# Patient Record
Sex: FEMALE | Race: WHITE | Employment: FULL TIME | URBAN - METROPOLITAN AREA
[De-identification: names, ages, dates, MRNs, and addresses within clinical notes are randomized per-mention and may not be internally consistent; named-entity substitution may affect disease eponyms.]

---

## 2016-07-25 LAB — NEGATIVE: NORMAL

## 2019-03-07 ENCOUNTER — OFFICE VISIT (OUTPATIENT)
Dept: FAMILY MEDICINE | Facility: CLINIC | Age: 69
End: 2019-03-07
Payer: COMMERCIAL

## 2019-03-07 VITALS
HEIGHT: 62 IN | BODY MASS INDEX: 41.04 KG/M2 | WEIGHT: 223 LBS | SYSTOLIC BLOOD PRESSURE: 172 MMHG | OXYGEN SATURATION: 94 % | TEMPERATURE: 97.7 F | HEART RATE: 69 BPM | DIASTOLIC BLOOD PRESSURE: 99 MMHG

## 2019-03-07 DIAGNOSIS — I10 ESSENTIAL HYPERTENSION: ICD-10-CM

## 2019-03-07 DIAGNOSIS — Z78.9 VEGETARIAN DIET: ICD-10-CM

## 2019-03-07 DIAGNOSIS — Z78.0 MENOPAUSE: ICD-10-CM

## 2019-03-07 DIAGNOSIS — G60.9 IDIOPATHIC PERIPHERAL NEUROPATHY: Primary | ICD-10-CM

## 2019-03-07 DIAGNOSIS — Z12.31 VISIT FOR SCREENING MAMMOGRAM: ICD-10-CM

## 2019-03-07 DIAGNOSIS — M15.0 PRIMARY OSTEOARTHRITIS INVOLVING MULTIPLE JOINTS: ICD-10-CM

## 2019-03-07 DIAGNOSIS — G47.00 INSOMNIA, UNSPECIFIED TYPE: ICD-10-CM

## 2019-03-07 LAB
ANION GAP SERPL CALCULATED.3IONS-SCNC: 9 MMOL/L (ref 3–14)
BUN SERPL-MCNC: 14 MG/DL (ref 7–30)
CALCIUM SERPL-MCNC: 9.1 MG/DL (ref 8.5–10.1)
CHLORIDE SERPL-SCNC: 104 MMOL/L (ref 94–109)
CHOLEST SERPL-MCNC: 221 MG/DL
CO2 SERPL-SCNC: 27 MMOL/L (ref 20–32)
CREAT SERPL-MCNC: 0.88 MG/DL (ref 0.52–1.04)
ERYTHROCYTE [DISTWIDTH] IN BLOOD BY AUTOMATED COUNT: 13.8 % (ref 10–15)
GFR SERPL CREATININE-BSD FRML MDRD: 68 ML/MIN/{1.73_M2}
GLUCOSE SERPL-MCNC: 107 MG/DL (ref 70–99)
HCT VFR BLD AUTO: 43.3 % (ref 35–47)
HDLC SERPL-MCNC: 57 MG/DL
HGB BLD-MCNC: 13.6 G/DL (ref 11.7–15.7)
LDLC SERPL CALC-MCNC: 133 MG/DL
MCH RBC QN AUTO: 31.7 PG (ref 26.5–33)
MCHC RBC AUTO-ENTMCNC: 31.4 G/DL (ref 31.5–36.5)
MCV RBC AUTO: 101 FL (ref 78–100)
NONHDLC SERPL-MCNC: 164 MG/DL
PLATELET # BLD AUTO: 242 10E9/L (ref 150–450)
POTASSIUM SERPL-SCNC: 4.2 MMOL/L (ref 3.4–5.3)
RBC # BLD AUTO: 4.29 10E12/L (ref 3.8–5.2)
SODIUM SERPL-SCNC: 141 MMOL/L (ref 133–144)
TRIGL SERPL-MCNC: 152 MG/DL
WBC # BLD AUTO: 5 10E9/L (ref 4–11)

## 2019-03-07 RX ORDER — DIPHENHYD/PHENYLEPH/ACETAMINOP 12.5-5-325
TABLET ORAL
COMMUNITY
Start: 2018-03-26

## 2019-03-07 RX ORDER — TRAZODONE HYDROCHLORIDE 50 MG/1
TABLET, FILM COATED ORAL
Qty: 60 TABLET | Refills: 3 | Status: SHIPPED | OUTPATIENT
Start: 2019-03-07

## 2019-03-07 RX ORDER — LOTEPREDNOL ETABONATE 5 MG/G
1-2 GEL OPHTHALMIC
COMMUNITY
Start: 2016-10-31

## 2019-03-07 RX ORDER — HYDROXYZINE HYDROCHLORIDE 10 MG/1
10 TABLET, FILM COATED ORAL
COMMUNITY
End: 2019-03-07

## 2019-03-07 RX ORDER — TRAZODONE HYDROCHLORIDE 50 MG/1
100 TABLET, FILM COATED ORAL
COMMUNITY
Start: 2016-12-07 | End: 2019-03-07

## 2019-03-07 RX ORDER — DULOXETIN HYDROCHLORIDE 60 MG/1
60 CAPSULE, DELAYED RELEASE ORAL DAILY
Qty: 90 CAPSULE | Refills: 0 | Status: SHIPPED | OUTPATIENT
Start: 2019-03-07 | End: 2019-06-10

## 2019-03-07 RX ORDER — LISINOPRIL 10 MG/1
10 TABLET ORAL DAILY
Qty: 90 TABLET | Refills: 0 | Status: SHIPPED | OUTPATIENT
Start: 2019-03-07 | End: 2019-06-10

## 2019-03-07 RX ORDER — HYDROXYZINE HYDROCHLORIDE 25 MG/1
TABLET, FILM COATED ORAL
Qty: 60 TABLET | Refills: 3 | Status: SHIPPED | OUTPATIENT
Start: 2019-03-07 | End: 2020-03-20

## 2019-03-07 RX ORDER — DULOXETIN HYDROCHLORIDE 30 MG/1
30 CAPSULE, DELAYED RELEASE ORAL
COMMUNITY
Start: 2019-01-02 | End: 2019-03-07

## 2019-03-07 RX ORDER — ZINC GLUCONATE 50 MG
TABLET ORAL
COMMUNITY

## 2019-03-07 RX ORDER — LISINOPRIL 10 MG/1
10 TABLET ORAL
COMMUNITY
Start: 2018-03-05 | End: 2019-03-07

## 2019-03-07 ASSESSMENT — MIFFLIN-ST. JEOR: SCORE: 1491.77

## 2019-03-07 NOTE — NURSING NOTE
"68 year old  Chief Complaint   Patient presents with     [facility]     pt is on medication for neurothrpy        Blood pressure (!) 172/99, pulse 69, temperature 97.7  F (36.5  C), temperature source Oral, height 1.57 m (5' 1.81\"), weight 101.2 kg (223 lb), SpO2 94 %. Body mass index is 41.04 kg/m .  There is no problem list on file for this patient.      Wt Readings from Last 2 Encounters:   19 101.2 kg (223 lb)   16 94 kg (207 lb 4.8 oz)     BP Readings from Last 3 Encounters:   19 (!) 172/99         Current Outpatient Medications   Medication     Blood Pressure Monitoring (BLOOD PRESSURE KIT) KIT     DULoxetine (CYMBALTA) 30 MG capsule     lisinopril (PRINIVIL/ZESTRIL) 10 MG tablet     Loteprednol Etabonate (LOTEMAX) 0.5 % GEL     traZODone (DESYREL) 50 MG tablet     hydrOXYzine (ATARAX) 10 MG tablet     olopatadine HCl (PATADAY) 0.2 % SOLN     zinc gluconate 50 MG tablet     No current facility-administered medications for this visit.        Social History     Tobacco Use     Smoking status: Former Smoker     Packs/day: 0.50     Years: 10.00     Pack years: 5.00     Types: Cigarettes, Clove cigarettes or kreteks     Start date: 1994     Last attempt to quit: 2004     Years since quittin.3     Smokeless tobacco: Never Used   Substance Use Topics     Alcohol use: Yes     Alcohol/week: 0.0 oz     Comment: very limited     Drug use: No       Health Maintenance Due   Topic Date Due     PHQ-2 Q1 YR  1962     HEPATITIS C SCREENING  1968     MAMMO SCREEN Q2 YR (SYSTEM ASSIGNED)  1990     LIPID SCREEN Q5 YR FEMALE (SYSTEM ASSIGNED)  1995     COLON CANCER SCREEN (SYSTEM ASSIGNED)  2000     ADVANCE DIRECTIVE PLANNING Q5 YRS  2005     ZOSTER IMMUNIZATION (2 of 3) 2014     MEDICARE ANNUAL WELLNESS VISIT  2015     FALL RISK ASSESSMENT  2015     DEXA SCAN SCREENING (SYSTEM ASSIGNED)  2015     INFLUENZA VACCINE (1) 2018 "       No results found for: PAP      March 7, 2019 12:53 PM

## 2019-03-07 NOTE — PROGRESS NOTES
"Fatou \"Lucinda\" Ector is a 68 year old female, new to [facility]. Her PCP recently decided to take time off of work, so she is looking to establish care with a PCP closer to where she lives in [place]. She is here for the following issues:    HCM  Lucinda is up to date on eye exams and dental visits. She no longer needs pap. She is up to date on colonoscopy, due in 2025. She will be due for a mammogram this summer. She had a breast biopsy many years ago, which was negative. She is up to date on immunizations. Discussed Shingrix vaccine. She is due for a DEXA scan. Of note, she reports she has lost about 2 inches of height. She eats a vegetarian diet.    Neuropathy  After her right hip replacement, she developed problems with her right sciatic nerve. She was experiencing severe shooting pain. She was started on gabapentin, which did not improve the pain. She also tried Cymbalta, which hs worked well for her, however it causes a nonpuritic rash across her stomach.  She underwent left hip replacement on 5/7/18, and developed the same shooting pain in her left leg. She took a course of prednisone, which worked well for her, but she could not continue long term. She was restarted on Cymbalta 30mg daily for 90 days recently, and that is working well for her. She occasionally experiences the shooting pain with certain movements, but generally her pain is diminished. She has some numbness in her bilateral thighs as well. Given that, she would like to increase her dose of Cymbalta to 60mg. She takes acetaminophen as needed for breakthrough pain.    Low back pain  She has 2 herniated discs in her lumbar spine. She primarily has pain on the right side of her back. This was initially treated with an injection of prednisone, which did not work for her. She has not had physical therapy recently. [facility] past, and is willing to go back to that clinic to start PT. "     Hypertension  She takes lisinopril 10mg daily for hypertension, and has taken the medication for many years. Her BP is elevated today, will recheck.  She has a home BP machine, and has had it calibrated in clinic. However, she has not been checking home readings recently. No chest pain or palpitations. No history of heart attack or stroke. She had pericarditis in 1973, but is unsure of the cause. She is due for medication refills.    Insomnia  She takes trazodone as needed for insomnia.    Restless legs  She takes hydroxyzine 10mg as needed for pain and restless legs. She reports using it rarely.    Weight gain  She reports some weight gain since her most recent hip replacement on 5/7/18. She has gained about 30-40 pounds, primarily due to being sedentary. She cooks at home most of the time, but when she works long hours she does not eat regular meals.    Seasonal Allergies  She experiences seasonal allergies in the spring and summer. No history of asthma.    Social  Senior dramaturge at the [employer]    3 children (2 in [place], 1 in [place]). 1 works at the [employer], 1 is a  at a restaurant in [place], and 1 is an actor in [place].    Habits  Tobacco: Former smoker, quit in 2004  Calcium: 2-3 servings/day, no calcium supplement, does take vitamin D 1000 international units during winter months  EtOH: 2/week, primarily in social settings  Caffeine: 1 cup coffee/day  Activity: Walking when weather is nice, but less active since her last hip replacement. Uses walking stick for balance in winter.    Patient Active Problem List   Diagnosis     Idiopathic peripheral neuropathy     Essential hypertension     Insomnia, unspecified type     Vegetarian diet       Current Outpatient Medications   Medication Sig Dispense Refill     Blood Pressure Monitoring (BLOOD PRESSURE KIT) KIT        DULoxetine (CYMBALTA) 30 MG capsule Take 30 mg by mouth       lisinopril (PRINIVIL/ZESTRIL) 10 MG tablet Take 10 mg by mouth        "Loteprednol Etabonate (LOTEMAX) 0.5 % GEL 1-2 drops       traZODone (DESYREL) 50 MG tablet Take 100 mg by mouth       hydrOXYzine (ATARAX) 10 MG tablet Take 10 mg by mouth       olopatadine HCl (PATADAY) 0.2 % SOLN        zinc gluconate 50 MG tablet          Allergies   Allergen Reactions     Penicillins Anaphylaxis     Nuts Swelling     Jojoba Rash        EXAM  BP (!) 172/99   Pulse 69   Temp 97.7  F (36.5  C) (Oral)   Ht 1.57 m (5' 1.81\")   Wt 101.2 kg (223 lb)   SpO2 94%   BMI 41.04 kg/m    Gen: Alert, pleasant, NAD, overweight  COR: S1,S2, no murmur  Lungs: CTA bilaterally, no rhonchi, wheezes or rales  Ext: no peripheral edema, pulses full  MS: Point tenderness over the paralumbar and upper gluteal muscles, R>L  Neuro: DTR +2/4 in all extremities      Assessment:  (G60.9) Idiopathic peripheral neuropathy  (primary encounter diagnosis)  Comment: chronic pain after bilateral hip replacements, cymbalta helping  Plan: DULoxetine (CYMBALTA) 60 MG capsule,         hydrOXYzine (ATARAX) 25 MG tablet        Increase dose to 60mg daily, refill hydroxyzine for prn use    (Z78.0) Menopause  Comment: due for DEXA scan  Plan: Dexa hip/pelvis/spine*        Gave calcium and D guidelines.     (I10) Essential hypertension  Comment: blood pressure is high  Plan: lisinopril (PRINIVIL/ZESTRIL) 10 MG tablet,         Basic metabolic panel        Recommend she make nurse appt to have BP check. Bring her home BP machine to clinic to compare readings. Could increase dose of lisinopril if needed to 20mg daily    (G47.00) Insomnia, unspecified type  Comment: uses trazodone intermittently  Plan: traZODone (DESYREL) 50 MG tablet        Refill for prn use    (Z12.31) Visit for screening mammogram  Comment: due for routine screening  Plan: Mammogram - routine screening            (Z78.9) Vegetarian diet  Comment: longstanding  Plan: CBC with platelets, Lipid Profile, Vitamin D         Deficiency        Will check for vitamin " deficiency    (M15.0) Primary osteoarthritis involving multiple joints  Comment: bilateral hip and low back pain  Plan: [facility].    Billie Gillette MD  Internal Medicine    I, Cee Nicolas, am serving as a scribe to document services personally performed by Dr. Billie Gillette, based on data collection and the provider's statements to me. Dr. Gillette has reviewed, edited, and approved the above note.

## 2019-03-08 LAB — DEPRECATED CALCIDIOL+CALCIFEROL SERPL-MC: 34 UG/L (ref 20–75)

## 2019-03-10 PROBLEM — G60.9 IDIOPATHIC PERIPHERAL NEUROPATHY: Status: ACTIVE | Noted: 2019-03-10

## 2019-03-10 PROBLEM — M15.0 PRIMARY OSTEOARTHRITIS INVOLVING MULTIPLE JOINTS: Status: ACTIVE | Noted: 2019-03-10

## 2019-03-10 PROBLEM — M1A.09X0 IDIOPATHIC CHRONIC GOUT OF MULTIPLE SITES WITHOUT TOPHUS: Status: ACTIVE | Noted: 2019-03-10

## 2019-03-10 PROBLEM — Z78.9 VEGETARIAN DIET: Status: ACTIVE | Noted: 2019-03-10

## 2019-03-10 PROBLEM — I10 ESSENTIAL HYPERTENSION: Status: ACTIVE | Noted: 2019-03-10

## 2019-03-10 PROBLEM — G47.00 INSOMNIA, UNSPECIFIED TYPE: Status: ACTIVE | Noted: 2019-03-10

## 2019-03-10 RX ORDER — CHOLECALCIFEROL (VITAMIN D3) 25 MCG
1 CAPSULE ORAL DAILY
Qty: 90 CAPSULE | Refills: 3 | COMMUNITY
Start: 2019-03-10

## 2019-06-10 ENCOUNTER — MYC REFILL (OUTPATIENT)
Dept: FAMILY MEDICINE | Facility: CLINIC | Age: 69
End: 2019-06-10

## 2019-06-10 DIAGNOSIS — G60.9 IDIOPATHIC PERIPHERAL NEUROPATHY: ICD-10-CM

## 2019-06-10 DIAGNOSIS — I10 ESSENTIAL HYPERTENSION: ICD-10-CM

## 2019-06-10 RX ORDER — LISINOPRIL 10 MG/1
10 TABLET ORAL DAILY
Qty: 90 TABLET | Refills: 0 | Status: SHIPPED | OUTPATIENT
Start: 2019-06-10 | End: 2019-09-15

## 2019-06-10 RX ORDER — DULOXETIN HYDROCHLORIDE 60 MG/1
60 CAPSULE, DELAYED RELEASE ORAL DAILY
Qty: 90 CAPSULE | Refills: 0 | Status: SHIPPED | OUTPATIENT
Start: 2019-06-10 | End: 2019-09-16

## 2019-06-10 NOTE — TELEPHONE ENCOUNTER
Last Office Visit: 3/7/19 - establish care  Future [facility] Appointments: none  Medication last refilled: 3/7/19 - #90 + 0 refill    Patient notified via [email or web address] that she is due for follow up about BP and cholesterol.    Prescription approved per [facility] Refill Protocol.  Justa Zendejas RN  06/10/19  4:59 PM

## 2019-09-15 PROBLEM — E78.5 HYPERLIPIDEMIA LDL GOAL <130: Status: ACTIVE | Noted: 2019-09-15

## 2019-09-15 NOTE — PROGRESS NOTES
"Fatou LEE \"Lucinda\" Ector is a 69 year old female here for the following issues:    Essential hypertension  Lucinda is a 68 yo female with a history of hypertension. She is taking lisinopril 10 mg daily. She denies current side effects with the medication. No current chest pain or fluttering.     BP Readings from Last 3 Encounters:   09/16/19 137/82   03/07/19 (!) 172/99       Hyperlipidemia  She has hyperlipidemia and is not on medication at this time. Her last LDL on 3/7/2019 was 133. She denies any symptoms of cardiovascular disease. No chest pain, SOB, orthopnea or perpherial edema.  She is not on any statin medications. She is a former smoker. Father with hx of heart disease.    The 10-year ASCVD risk score ([place] AUBREY Jr., et al., 2013) is: 13.2%        Recent Labs   Lab Test 03/07/19  1425   CHOL 221*   HDL 57   *   TRIG 152*       Neuropathy  She takes Cymbalta 60 mg daily for neuropathic pain in her right posterior hip, a complication from a total hip replacement surgery. She notes a recent flare of pain with change in weather. She is unsure if the dosage is effective. She has taken ibuprofen with mild improvement.     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 She reports having more anxiety. She is using hydroxyzine at hs to help get settled to sleep. Discussed increasing Cymbalta to manage neuropathic pain as well as anxiety.     PHQ9 score = 4  JANEL 7 score = 5      Patient Active Problem List   Diagnosis     Idiopathic peripheral neuropathy     Essential hypertension     Insomnia, unspecified type     Vegetarian diet     Primary osteoarthritis involving multiple joints     Idiopathic chronic gout of multiple sites without tophus     Hyperlipidemia LDL goal <130       Current Outpatient Medications   Medication Sig Dispense Refill     Blood Pressure Monitoring (BLOOD PRESSURE KIT) KIT        Cholecalciferol (VITAMIN D-3) 1000 units CAPS Take 1 capsule by mouth daily " "In winter months 90 capsule 3     DULoxetine (CYMBALTA) 60 MG capsule Take 1 capsule (60 mg) by mouth daily 90 capsule 0     hydrOXYzine (ATARAX) 25 MG tablet Take one to two tablets daily as needed 60 tablet 3     lisinopril (PRINIVIL/ZESTRIL) 10 MG tablet Take 1 tablet (10 mg) by mouth daily 90 tablet 0     Loteprednol Etabonate (LOTEMAX) 0.5 % GEL 1-2 drops       olopatadine HCl (PATADAY) 0.2 % SOLN        traZODone (DESYREL) 50 MG tablet Take 1-2 po q hs 60 tablet 3     zinc gluconate 50 MG tablet          Allergies   Allergen Reactions     Penicillins Anaphylaxis     Nuts Swelling     Jojoba Rash        EXAM  /82 (BP Location: Right arm, Patient Position: Sitting, Cuff Size: Adult Large)   Pulse 75   Temp 97.9  F (36.6  C) (Oral)   Ht 1.6 m (5' 3\")   Wt 98.1 kg (216 lb 4 oz)   SpO2 97%   BMI 38.31 kg/m    Gen: Alert, pleasant, NAD, overweight  COR: S1,S2, no murmur  Lungs: CTA bilaterally, no rhonchi, wheezes or rales  Ext: no peripheral edema, pulses full    Assessment:  (I10) Essential hypertension  (primary encounter diagnosis)  Comment: Blood pressure in target range today.   Plan: lisinopril (PRINIVIL/ZESTRIL) 10 MG tablet        Medication refilled 1x year    (E78.5) Hyperlipidemia LDL goal <130  Comment: Last LDL was 133. Not on current medication.   Plan: simvastatin (ZOCOR) 20 MG tablet        Prescribed simvastatin 20 mg daily. RTC in 6 weeks for recheck of labs and visit with MD.     (G60.9) Idiopathic peripheral neuropathy  Comment: Chronic bilateral hip pain after hip replacements, Cymbalta helping  Plan: DULoxetine (CYMBALTA) 60 MG capsule, DULoxetine        (CYMBALTA) 30 MG capsule        Increase dose to 90 mg daily. RTC in 6 weeks for follow up.     May also see Dr. Dillan Swanson for persistent right hip pain.       Billie Gillette MD  Internal Medicine/Pediatrics    I, Kyle Slaughter, am serving as a scribe to document services personally performed by Dr. Billie Gillette, based on data " collection and the provider's statements to me. Dr. Gillette has reviewed, edited and approved the above note.

## 2019-09-16 ENCOUNTER — OFFICE VISIT (OUTPATIENT)
Dept: FAMILY MEDICINE | Facility: CLINIC | Age: 69
End: 2019-09-16
Payer: COMMERCIAL

## 2019-09-16 VITALS
OXYGEN SATURATION: 97 % | WEIGHT: 216.25 LBS | DIASTOLIC BLOOD PRESSURE: 82 MMHG | TEMPERATURE: 97.9 F | HEART RATE: 75 BPM | HEIGHT: 63 IN | SYSTOLIC BLOOD PRESSURE: 137 MMHG | BODY MASS INDEX: 38.32 KG/M2

## 2019-09-16 DIAGNOSIS — I10 ESSENTIAL HYPERTENSION: Primary | ICD-10-CM

## 2019-09-16 DIAGNOSIS — E78.5 HYPERLIPIDEMIA LDL GOAL <130: ICD-10-CM

## 2019-09-16 DIAGNOSIS — G60.9 IDIOPATHIC PERIPHERAL NEUROPATHY: ICD-10-CM

## 2019-09-16 RX ORDER — LISINOPRIL 10 MG/1
10 TABLET ORAL DAILY
Qty: 90 TABLET | Refills: 3 | Status: SHIPPED | OUTPATIENT
Start: 2019-09-16 | End: 2020-09-03

## 2019-09-16 RX ORDER — SIMVASTATIN 20 MG
20 TABLET ORAL AT BEDTIME
Qty: 90 TABLET | Refills: 0 | Status: SHIPPED | OUTPATIENT
Start: 2019-09-16 | End: 2019-12-20

## 2019-09-16 RX ORDER — DULOXETIN HYDROCHLORIDE 60 MG/1
60 CAPSULE, DELAYED RELEASE ORAL DAILY
Qty: 90 CAPSULE | Refills: 3 | Status: SHIPPED | OUTPATIENT
Start: 2019-09-16 | End: 2020-09-03

## 2019-09-16 RX ORDER — DULOXETIN HYDROCHLORIDE 30 MG/1
30 CAPSULE, DELAYED RELEASE ORAL 2 TIMES DAILY
Qty: 90 CAPSULE | Refills: 3 | Status: SHIPPED | OUTPATIENT
Start: 2019-09-16 | End: 2020-07-24

## 2019-09-16 ASSESSMENT — ANXIETY QUESTIONNAIRES
GAD7 TOTAL SCORE: 5
6. BECOMING EASILY ANNOYED OR IRRITABLE: NOT AT ALL
2. NOT BEING ABLE TO STOP OR CONTROL WORRYING: SEVERAL DAYS
5. BEING SO RESTLESS THAT IT IS HARD TO SIT STILL: NOT AT ALL
7. FEELING AFRAID AS IF SOMETHING AWFUL MIGHT HAPPEN: SEVERAL DAYS
1. FEELING NERVOUS, ANXIOUS, OR ON EDGE: SEVERAL DAYS
IF YOU CHECKED OFF ANY PROBLEMS ON THIS QUESTIONNAIRE, HOW DIFFICULT HAVE THESE PROBLEMS MADE IT FOR YOU TO DO YOUR WORK, TAKE CARE OF THINGS AT HOME, OR GET ALONG WITH OTHER PEOPLE: NOT DIFFICULT AT ALL
3. WORRYING TOO MUCH ABOUT DIFFERENT THINGS: SEVERAL DAYS

## 2019-09-16 ASSESSMENT — MIFFLIN-ST. JEOR: SCORE: 1475.03

## 2019-09-16 ASSESSMENT — PAIN SCALES - GENERAL: PAINLEVEL: SEVERE PAIN (6)

## 2019-09-16 ASSESSMENT — PATIENT HEALTH QUESTIONNAIRE - PHQ9
SUM OF ALL RESPONSES TO PHQ QUESTIONS 1-9: 4
5. POOR APPETITE OR OVEREATING: SEVERAL DAYS

## 2019-09-16 NOTE — NURSING NOTE
"69 year old  Chief Complaint   Patient presents with     Recheck Medication     refills on lisinpril and cymbalta      Musculoskeletal Problem     right hip pain x 1.5 wks        Blood pressure 137/82, pulse 75, temperature 97.9  F (36.6  C), temperature source Oral, height 1.6 m (5' 3\"), weight 98.1 kg (216 lb 4 oz), SpO2 97 %. Body mass index is 38.31 kg/m .  Patient Active Problem List   Diagnosis     Idiopathic peripheral neuropathy     Essential hypertension     Insomnia, unspecified type     Vegetarian diet     Primary osteoarthritis involving multiple joints     Idiopathic chronic gout of multiple sites without tophus     Hyperlipidemia LDL goal <130       Wt Readings from Last 2 Encounters:   19 98.1 kg (216 lb 4 oz)   19 101.2 kg (223 lb)     BP Readings from Last 3 Encounters:   19 137/82   19 (!) 172/99         Current Outpatient Medications   Medication     Blood Pressure Monitoring (BLOOD PRESSURE KIT) KIT     Cholecalciferol (VITAMIN D-3) 1000 units CAPS     DULoxetine (CYMBALTA) 60 MG capsule     hydrOXYzine (ATARAX) 25 MG tablet     lisinopril (PRINIVIL/ZESTRIL) 10 MG tablet     Loteprednol Etabonate (LOTEMAX) 0.5 % GEL     olopatadine HCl (PATADAY) 0.2 % SOLN     traZODone (DESYREL) 50 MG tablet     zinc gluconate 50 MG tablet     No current facility-administered medications for this visit.        Social History     Tobacco Use     Smoking status: Former Smoker     Packs/day: 0.50     Years: 10.00     Pack years: 5.00     Types: Cigarettes, Clove cigarettes or kreteks     Start date: 1994     Last attempt to quit: 2004     Years since quittin.8     Smokeless tobacco: Never Used   Substance Use Topics     Alcohol use: Yes     Alcohol/week: 0.0 oz     Comment: very limited     Drug use: No       Health Maintenance Due   Topic Date Due     DEXA  1950     ADVANCE CARE PLANNING  1950     MAMMO SCREENING  1950     ZOSTER IMMUNIZATION (2 of 3) " 08/01/2014     MEDICARE ANNUAL WELLNESS VISIT  07/14/2015     FALL RISK ASSESSMENT  07/14/2015     PHQ-2  01/01/2019     INFLUENZA VACCINE (1) 09/01/2019       No results found for: PAP      September 16, 2019 2:30 PM

## 2019-09-16 NOTE — PATIENT INSTRUCTIONS
Joel Swanson MD  Sports medicine at [facility]    Estefania nutritionist at [facility]

## 2019-09-17 ASSESSMENT — ANXIETY QUESTIONNAIRES: GAD7 TOTAL SCORE: 5

## 2019-12-17 ENCOUNTER — TELEPHONE (OUTPATIENT)
Dept: FAMILY MEDICINE | Facility: CLINIC | Age: 69
End: 2019-12-17

## 2019-12-17 NOTE — TELEPHONE ENCOUNTER
Central Prior Authorization Team   Phone: [phone]    PA Initiation    Medication: hydrOXYzine (ATARAX) 25 MG tablet  Insurance Company: [employer] - Phone [phone] Fax [phone]  Pharmacy Filling the Rx: [facility] #[id number]  Filling Pharmacy Phone: [phone]  Filling Pharmacy Fax:    Start Date: 12/17/2019

## 2019-12-17 NOTE — TELEPHONE ENCOUNTER
Prior Authorization Retail Medication Request    Medication/Dose: hydroxyzine  ICD code (if different than what is on RX):  same  Previously Tried and Failed:    Rationale:      Insurance Name:  same  Insurance ID:  same      Pharmacy Information (if different than what is on RX)  Name:  [facility]  Phone:  [phone]    Lilliam Yan RN  [facility]

## 2019-12-18 NOTE — TELEPHONE ENCOUNTER
PRIOR AUTHORIZATION DENIED    Medication: hydrOXYzine (ATARAX) 25 MG tablet-DENIED    Denial Date: 12/18/2019    Denial Rational:             Appeal Information:

## 2019-12-19 DIAGNOSIS — E78.5 HYPERLIPIDEMIA LDL GOAL <130: ICD-10-CM

## 2019-12-19 NOTE — TELEPHONE ENCOUNTER
Last time prescribed: 9/16/19 , 90 tabs x 0 refills  Last office visit: 9/16/19  Next appointment: No future appointments    Prescription approved per [facility] Refill Protocol.  Lilliam Yan RN  [facility]

## 2019-12-20 RX ORDER — SIMVASTATIN 20 MG
20 TABLET ORAL AT BEDTIME
Qty: 90 TABLET | Refills: 0 | Status: SHIPPED | OUTPATIENT
Start: 2019-12-20

## 2020-03-01 ENCOUNTER — HEALTH MAINTENANCE LETTER (OUTPATIENT)
Age: 70
End: 2020-03-01

## 2020-03-20 DIAGNOSIS — G60.9 IDIOPATHIC PERIPHERAL NEUROPATHY: ICD-10-CM

## 2020-03-20 RX ORDER — HYDROXYZINE HYDROCHLORIDE 25 MG/1
TABLET, FILM COATED ORAL
Qty: 60 TABLET | Refills: 3 | Status: SHIPPED | OUTPATIENT
Start: 2020-03-20 | End: 2020-10-09

## 2020-03-20 NOTE — TELEPHONE ENCOUNTER
Last time prescribed: 3/7/19 , 60 tabs/caps x 3 refills  Last office visit: 9/16/19  Next appointment: No Future Appointment Scheduled    Prescription approved per [facility] Refill Protocol.  Lilliam Yan RN  [facility]

## 2020-07-09 ENCOUNTER — TELEPHONE (OUTPATIENT)
Dept: FAMILY MEDICINE | Facility: CLINIC | Age: 70
End: 2020-07-09

## 2020-07-09 NOTE — TELEPHONE ENCOUNTER
I called patient based on a [facility] scheduling request, indicating a fall 10 days ago and continues with shoulder pain. Patient did not answer. LVM - call back to speak to a nurse or call [facility] for an appointment, phone number provided.     Justa Zendejas RN  07/09/20  3:11 PM

## 2020-07-22 DIAGNOSIS — G60.9 IDIOPATHIC PERIPHERAL NEUROPATHY: ICD-10-CM

## 2020-07-23 NOTE — TELEPHONE ENCOUNTER
Last office visit was on 09/16/2019, no future visits scheduled.    Prescription approved per [facility] Refill Protocol.      Confirming with pt how taking med,  Is the 30 mg BID or just once  every day only?  Thinking the later, so changed sig to reflect how taking, but want to confirm with pt.       Per note of 9/16/19 , Leta wants at total of 90 mg daily      Pt called back and we confirmed, she is just doing one of each Cymbalta daily for a total of 90 mg/day.    Of note, pt states this increase change back in September did the trick for her pain.     Routing refill request to provider for review/approval because:  Drug interaction warning      Fatmata Spicer RN  July 24, 2020 1:09 PM

## 2020-07-27 RX ORDER — DULOXETIN HYDROCHLORIDE 30 MG/1
30 CAPSULE, DELAYED RELEASE ORAL DAILY
Qty: 30 CAPSULE | Refills: 0 | Status: SHIPPED | OUTPATIENT
Start: 2020-07-27 | End: 2020-08-25

## 2020-08-22 DIAGNOSIS — G60.9 IDIOPATHIC PERIPHERAL NEUROPATHY: ICD-10-CM

## 2020-08-24 NOTE — TELEPHONE ENCOUNTER
Last time prescribed: 7/27/20 , 30 tabs/caps x 0 refills  Last office visit: 9/16/19  Next appointment: 9/1/20    Medication is being filled for 1 time refill only due to:  Patient needs to be seen because it has been more than one year since last visit.   Lilliam Yan RN  [facility]

## 2020-08-25 RX ORDER — DULOXETIN HYDROCHLORIDE 30 MG/1
30 CAPSULE, DELAYED RELEASE ORAL DAILY
Qty: 30 CAPSULE | Refills: 0 | Status: SHIPPED | OUTPATIENT
Start: 2020-08-25

## 2020-09-02 DIAGNOSIS — G60.9 IDIOPATHIC PERIPHERAL NEUROPATHY: ICD-10-CM

## 2020-09-02 DIAGNOSIS — I10 ESSENTIAL HYPERTENSION: ICD-10-CM

## 2020-09-02 NOTE — TELEPHONE ENCOUNTER
Lisinopril: Last time prescribed: 9/19/19 , 90 tabs x 3 refills  Duloxetine: Last time prescribed: 9/16/19 , 90 caps x 3 refills    Last office visit: 9/16/19  Next appointment: No future appointments     Medication is being filled for 1 time refill only due to:  Patient needs labs BMP. Future labs ordered BMP. Patient needs to be seen because it has been more than one year since last visit. due for visit in Sept.  Lilliam Yan RN  [facility]

## 2020-09-03 RX ORDER — LISINOPRIL 10 MG/1
10 TABLET ORAL DAILY
Qty: 90 TABLET | Refills: 0 | Status: SHIPPED | OUTPATIENT
Start: 2020-09-03

## 2020-09-03 RX ORDER — DULOXETIN HYDROCHLORIDE 60 MG/1
60 CAPSULE, DELAYED RELEASE ORAL DAILY
Qty: 90 CAPSULE | Refills: 0 | Status: SHIPPED | OUTPATIENT
Start: 2020-09-03

## 2020-09-17 DIAGNOSIS — G60.9 IDIOPATHIC PERIPHERAL NEUROPATHY: ICD-10-CM

## 2020-09-17 NOTE — TELEPHONE ENCOUNTER
Last time prescribed: 8/25/20 , 30 tabs/caps x 0 refills  Last office visit: 9/16/19  Next appointment: No Future Appointment Scheduled    Medication is being filled for 1 time refill only due to:  Patient needs to be seen because it has been more than one year since last visit.  Needs BP check .   Couple of weeks ago given 90 days on her 60 mg caps, this refill  will even up script, as takes two together  For total of 90 mg daily.    Routing refill request to provider for review/approval because:  Drug interaction warning    Fatmata Spicer RN  September 18, 2020 2:56 PM

## 2020-09-21 RX ORDER — DULOXETIN HYDROCHLORIDE 30 MG/1
30 CAPSULE, DELAYED RELEASE ORAL DAILY
Qty: 90 CAPSULE | Refills: 0 | OUTPATIENT
Start: 2020-09-21

## 2020-09-22 NOTE — TELEPHONE ENCOUNTER
Lucinda has indicated she is not returning to [facility] for care and will be seen at [facility]. All Rx should be routed to new provider.  Thanks  Billie Gillette MD  Internal Medicine/Pediatrics

## 2020-10-08 DIAGNOSIS — G60.9 IDIOPATHIC PERIPHERAL NEUROPATHY: ICD-10-CM

## 2020-10-08 NOTE — TELEPHONE ENCOUNTER
Last office visit was on 09/16/2019, no future visits scheduled.  Medication is being filled for 1 time refill only due to:  Patient needs to be seen because it has been more than one year since last visit.   Lilliam Yan RN  [facility]

## 2020-10-09 ENCOUNTER — TELEPHONE (OUTPATIENT)
Dept: FAMILY MEDICINE | Facility: CLINIC | Age: 70
End: 2020-10-09

## 2020-10-09 RX ORDER — HYDROXYZINE HYDROCHLORIDE 25 MG/1
TABLET, FILM COATED ORAL
Qty: 60 TABLET | Refills: 0 | Status: SHIPPED | OUTPATIENT
Start: 2020-10-09

## 2020-10-09 NOTE — TELEPHONE ENCOUNTER
Central Prior Authorization Team   Phone: [phone]      Denied via EPA - waiting for denial letter.

## 2020-10-12 NOTE — TELEPHONE ENCOUNTER
Central Prior Authorization Team   Phone: [phone]      PRIOR AUTHORIZATION DENIED    Medication: hydrOXYzine (ATARAX) 25 MG tablet - DENIED    Denial Date: 10/9/2020    Denial Rational: Pt does not have an FDA approved diagnosis.      Appeal Information:

## 2020-10-12 NOTE — TELEPHONE ENCOUNTER
Central Prior Authorization Team   Phone: [phone]      Marino Medina represtative, will refax determination letter as we did not receive it.    Case # [id number]

## 2020-12-13 ENCOUNTER — HEALTH MAINTENANCE LETTER (OUTPATIENT)
Age: 70
End: 2020-12-13

## 2021-04-17 ENCOUNTER — HEALTH MAINTENANCE LETTER (OUTPATIENT)
Age: 71
End: 2021-04-17

## 2021-09-26 ENCOUNTER — HEALTH MAINTENANCE LETTER (OUTPATIENT)
Age: 71
End: 2021-09-26

## 2022-03-13 ENCOUNTER — HEALTH MAINTENANCE LETTER (OUTPATIENT)
Age: 72
End: 2022-03-13

## 2022-05-08 ENCOUNTER — HEALTH MAINTENANCE LETTER (OUTPATIENT)
Age: 72
End: 2022-05-08

## 2023-01-14 ENCOUNTER — HEALTH MAINTENANCE LETTER (OUTPATIENT)
Age: 73
End: 2023-01-14

## 2023-06-02 ENCOUNTER — HEALTH MAINTENANCE LETTER (OUTPATIENT)
Age: 73
End: 2023-06-02